# Patient Record
Sex: MALE | Race: BLACK OR AFRICAN AMERICAN | NOT HISPANIC OR LATINO | Employment: OTHER | URBAN - METROPOLITAN AREA
[De-identification: names, ages, dates, MRNs, and addresses within clinical notes are randomized per-mention and may not be internally consistent; named-entity substitution may affect disease eponyms.]

---

## 2017-01-03 ENCOUNTER — OFFICE VISIT (OUTPATIENT)
Dept: HEMATOLOGY/ONCOLOGY | Facility: CLINIC | Age: 72
End: 2017-01-03
Payer: MEDICARE

## 2017-01-03 ENCOUNTER — LAB VISIT (OUTPATIENT)
Dept: LAB | Facility: HOSPITAL | Age: 72
End: 2017-01-03
Attending: INTERNAL MEDICINE
Payer: MEDICARE

## 2017-01-03 VITALS
HEART RATE: 107 BPM | BODY MASS INDEX: 15.76 KG/M2 | SYSTOLIC BLOOD PRESSURE: 111 MMHG | WEIGHT: 122.81 LBS | RESPIRATION RATE: 17 BRPM | HEIGHT: 74 IN | DIASTOLIC BLOOD PRESSURE: 65 MMHG

## 2017-01-03 DIAGNOSIS — N18.6 ESRD (END STAGE RENAL DISEASE): ICD-10-CM

## 2017-01-03 DIAGNOSIS — C18.9 COLON CANCER METASTASIZED TO LIVER: ICD-10-CM

## 2017-01-03 DIAGNOSIS — C18.2 MALIGNANT NEOPLASM OF ASCENDING COLON: ICD-10-CM

## 2017-01-03 DIAGNOSIS — C18.7 MALIGNANT NEOPLASM OF SIGMOID COLON: Primary | ICD-10-CM

## 2017-01-03 DIAGNOSIS — C78.7 COLON CANCER METASTASIZED TO LIVER: ICD-10-CM

## 2017-01-03 DIAGNOSIS — C18.7 MALIGNANT NEOPLASM OF SIGMOID COLON: ICD-10-CM

## 2017-01-03 DIAGNOSIS — C78.00 MALIGNANT NEOPLASM METASTATIC TO LUNG, UNSPECIFIED LATERALITY: ICD-10-CM

## 2017-01-03 LAB
ALBUMIN SERPL BCP-MCNC: 2.6 G/DL
ALP SERPL-CCNC: 186 U/L
ALT SERPL W/O P-5'-P-CCNC: 26 U/L
ANION GAP SERPL CALC-SCNC: 8 MMOL/L
ANISOCYTOSIS BLD QL SMEAR: ABNORMAL
AST SERPL-CCNC: 26 U/L
BASOPHILS # BLD AUTO: 0.01 K/UL
BASOPHILS NFR BLD: 0.1 %
BILIRUB SERPL-MCNC: 0.7 MG/DL
BUN SERPL-MCNC: 9 MG/DL
CALCIUM SERPL-MCNC: 7.3 MG/DL
CHLORIDE SERPL-SCNC: 96 MMOL/L
CO2 SERPL-SCNC: 37 MMOL/L
CREAT SERPL-MCNC: 2 MG/DL
DIFFERENTIAL METHOD: ABNORMAL
EOSINOPHIL # BLD AUTO: 0 K/UL
EOSINOPHIL NFR BLD: 0.1 %
ERYTHROCYTE [DISTWIDTH] IN BLOOD BY AUTOMATED COUNT: 19.4 %
EST. GFR  (AFRICAN AMERICAN): 38 ML/MIN/1.73 M^2
EST. GFR  (NON AFRICAN AMERICAN): 33 ML/MIN/1.73 M^2
GLUCOSE SERPL-MCNC: 106 MG/DL
HCT VFR BLD AUTO: 24.7 %
HGB BLD-MCNC: 7.5 G/DL
HYPOCHROMIA BLD QL SMEAR: ABNORMAL
INR PPP: 1.6
LYMPHOCYTES # BLD AUTO: 1.8 K/UL
LYMPHOCYTES NFR BLD: 16.5 %
MCH RBC QN AUTO: 31.3 PG
MCHC RBC AUTO-ENTMCNC: 30.4 %
MCV RBC AUTO: 103 FL
MONOCYTES # BLD AUTO: 1.7 K/UL
MONOCYTES NFR BLD: 15.9 %
NEUTROPHILS # BLD AUTO: 7.2 K/UL
NEUTROPHILS NFR BLD: 67.4 %
NRBC BLD-RTO: 0 /100 WBC
PLATELET # BLD AUTO: 136 K/UL
PLATELET BLD QL SMEAR: ABNORMAL
PMV BLD AUTO: 10.6 FL
POLYCHROMASIA BLD QL SMEAR: ABNORMAL
POTASSIUM SERPL-SCNC: 3.5 MMOL/L
PROT SERPL-MCNC: 6.3 G/DL
RBC # BLD AUTO: 2.4 M/UL
SODIUM SERPL-SCNC: 141 MMOL/L
WBC # BLD AUTO: 10.61 K/UL

## 2017-01-03 PROCEDURE — 85025 COMPLETE CBC W/AUTO DIFF WBC: CPT

## 2017-01-03 PROCEDURE — 99214 OFFICE O/P EST MOD 30 MIN: CPT | Mod: PBBFAC,PN | Performed by: INTERNAL MEDICINE

## 2017-01-03 PROCEDURE — 80053 COMPREHEN METABOLIC PANEL: CPT

## 2017-01-03 PROCEDURE — 85025 COMPLETE CBC W/AUTO DIFF WBC: CPT | Mod: PN

## 2017-01-03 PROCEDURE — 80053 COMPREHEN METABOLIC PANEL: CPT | Mod: PN

## 2017-01-03 PROCEDURE — 99999 PR PBB SHADOW E&M-EST. PATIENT-LVL IV: CPT | Mod: PBBFAC,,, | Performed by: INTERNAL MEDICINE

## 2017-01-03 PROCEDURE — 99215 OFFICE O/P EST HI 40 MIN: CPT | Mod: S$PBB,,, | Performed by: INTERNAL MEDICINE

## 2017-01-03 RX ORDER — HEPARIN 100 UNIT/ML
500 SYRINGE INTRAVENOUS
Status: CANCELLED | OUTPATIENT
Start: 2017-01-06

## 2017-01-03 RX ORDER — FLUOROURACIL 50 MG/ML
400 INJECTION, SOLUTION INTRAVENOUS
Status: CANCELLED | OUTPATIENT
Start: 2017-01-04

## 2017-01-03 RX ORDER — SODIUM CHLORIDE 0.9 % (FLUSH) 0.9 %
10 SYRINGE (ML) INJECTION
Status: CANCELLED | OUTPATIENT
Start: 2017-01-06

## 2017-01-03 NOTE — PROGRESS NOTES
"Subjective:        Subjective:       Patient ID: Robert Gonsales is a 71 y.o. male.    Chief Complaint:wenrt for dialysis with DR. Owusu and here for chemo clearance  HPI:   A 71-year-old  male coming in consultation. [facility]   [facility] Wife states that he has been trying to get   an appointment for the past several months. He was diagnosed with colon cancer,  high-grade 5 cm, nodes positive 5 out of 38 were removed. In April 2016, he   had extensive lysis of adhesions, right hepatic resection for metastatic   lesions. In October 2015, the patient had FOLFIRI planned six cycles completed   only one out of six. In September 2015, the patient had left hepatic resection.  The patient started diagnosis in June 2014 with right partial colectomy and   liver biopsy. In August 2014, the patient had FOLFOX with Avastin 12 cycles   were planned. He completed two out of 15. The patient wants to continue with   treatment here. He is on Coumadin because of atrial fibrillation. He also has   gout. The patient has a history of congestive heart failure, coronary artery   disease, diabetes and dyslipidemia. No other significant past surgical history   except as above. The patient is here with his wife. [facility] most recent [facility], which showed multiple lung nodules consistent with   metastatic disease. Since then, the patient has not been given any treatment of  seeing an oncologist. The patient he is on dialysis, Tuesday/thursday/ ansd Saturday.  Past 2 years.  his appetitie is poor, he ambulates with a walker, patient wants to try chemotherapy. " wants to live"  Pt had seen Dr. Vegas and received chemotherapy [facility] then [facility] not [facility]  Started FOLFOX + Avastin, here for cycle #3  Seems to have tolerated rx wel. Drinking nepro's not eating much, weak, tired. Needs refill on pain meds    PHYSICAL EXAM:     Vitals:    01/03/17 1347 "   BP: 111/65   Pulse: 107   Resp: 17     Debilitated, black male, disheveled, seems out of sortds, in wheelchair,awake and alert, asking for copies of our lab order sheet.   rs bilat CTA, CVS: normal exam no wheezes, no ronchi  Abd: soft, nontender, no nmasses    Laboratory:     CBC:  Lab Results   Component Value Date    WBC 10.61 01/03/2017    RBC 2.40 (L) 01/03/2017    HGB 7.5 (L) 01/03/2017    HCT 24.7 (L) 01/03/2017     (H) 01/03/2017    MCH 31.3 (H) 01/03/2017    MCHC 30.4 (L) 01/03/2017    RDW 19.4 (H) 01/03/2017     (L) 01/03/2017    MPV 10.6 01/03/2017    GRAN 7.2 01/03/2017    GRAN 67.4 01/03/2017    LYMPH 1.8 01/03/2017    LYMPH 16.5 (L) 01/03/2017    MONO 1.7 (H) 01/03/2017    MONO 15.9 (H) 01/03/2017    EOS 0.0 01/03/2017    BASO 0.01 01/03/2017    EOSINOPHIL 0.1 01/03/2017    BASOPHIL 0.1 01/03/2017       BMP: BMP  Lab Results   Component Value Date     01/03/2017    K 3.5 01/03/2017    CL 96 01/03/2017    CO2 37 (H) 01/03/2017    BUN 9 01/03/2017    CREATININE 2.00 (H) 01/03/2017    CALCIUM 7.3 (L) 01/03/2017    ANIONGAP 8 01/03/2017    ESTGFRAFRICA 38 (A) 01/03/2017    EGFRNONAA 33 (A) 01/03/2017       LFT:   Lab Results   Component Value Date    ALT 26 01/03/2017    AST 26 01/03/2017    ALKPHOS 186 (H) 01/03/2017    BILITOT 0.7 01/03/2017     BNP will remain high per DR. pino due to renal failure so cardiolgy f/u canceled spoke to DR. Pino    Assessment/Plan:       1. Malignant neoplasm of sigmoid colon    2. Malignant neoplasm metastatic to lung, unspecified laterality    3. Colon cancer metastasized to liver    4. ESRD (end stage renal disease)        Severe anemia: 2 units prbc asap:talked to DR. Pino, he will transfuse during dialysis on thursday  Colon ca metastatic to lung and liver. Proceed with cycle #3 of FOLFOX with avastin  ESRD: pt continues with dialysis    rtc 2 weeks for ongoing rx with FOLFOX + Avastin with cbc, cmp   norco increased to 10 mg q 4hrs -q6  hiurs

## 2017-01-04 ENCOUNTER — INFUSION (OUTPATIENT)
Dept: INFUSION THERAPY | Facility: HOSPITAL | Age: 72
End: 2017-01-04
Attending: INTERNAL MEDICINE
Payer: MEDICARE

## 2017-01-04 ENCOUNTER — ANTI-COAG VISIT (OUTPATIENT)
Dept: CARDIOLOGY | Facility: CLINIC | Age: 72
End: 2017-01-04

## 2017-01-04 ENCOUNTER — TELEPHONE (OUTPATIENT)
Dept: INFUSION THERAPY | Facility: HOSPITAL | Age: 72
End: 2017-01-04

## 2017-01-04 VITALS
BODY MASS INDEX: 16.22 KG/M2 | WEIGHT: 126.38 LBS | HEART RATE: 84 BPM | RESPIRATION RATE: 18 BRPM | TEMPERATURE: 98 F | DIASTOLIC BLOOD PRESSURE: 82 MMHG | SYSTOLIC BLOOD PRESSURE: 157 MMHG

## 2017-01-04 DIAGNOSIS — C18.9 METASTATIC COLON CANCER TO LIVER: ICD-10-CM

## 2017-01-04 DIAGNOSIS — Z79.01 LONG-TERM (CURRENT) USE OF ANTICOAGULANTS: ICD-10-CM

## 2017-01-04 DIAGNOSIS — C78.7 METASTATIC COLON CANCER TO LIVER: ICD-10-CM

## 2017-01-04 DIAGNOSIS — C78.00 MALIGNANT NEOPLASM METASTATIC TO LUNG, UNSPECIFIED LATERALITY: Primary | ICD-10-CM

## 2017-01-04 PROCEDURE — 96417 CHEMO IV INFUS EACH ADDL SEQ: CPT | Mod: PN

## 2017-01-04 PROCEDURE — 96368 THER/DIAG CONCURRENT INF: CPT | Mod: PN

## 2017-01-04 PROCEDURE — 96367 TX/PROPH/DG ADDL SEQ IV INF: CPT | Mod: PN

## 2017-01-04 PROCEDURE — 96416 CHEMO PROLONG INFUSE W/PUMP: CPT | Mod: PN

## 2017-01-04 PROCEDURE — 96415 CHEMO IV INFUSION ADDL HR: CPT | Mod: PN

## 2017-01-04 PROCEDURE — 25000003 PHARM REV CODE 250: Mod: PN | Performed by: INTERNAL MEDICINE

## 2017-01-04 PROCEDURE — 96411 CHEMO IV PUSH ADDL DRUG: CPT | Mod: PN

## 2017-01-04 PROCEDURE — 63600175 PHARM REV CODE 636 W HCPCS: Mod: PN | Performed by: INTERNAL MEDICINE

## 2017-01-04 PROCEDURE — 96413 CHEMO IV INFUSION 1 HR: CPT | Mod: PN

## 2017-01-04 RX ORDER — FLUOROURACIL 50 MG/ML
400 INJECTION, SOLUTION INTRAVENOUS
Status: COMPLETED | OUTPATIENT
Start: 2017-01-04 | End: 2017-01-04

## 2017-01-04 RX ADMIN — PALONOSETRON HYDROCHLORIDE 0.25 MG: 0.25 INJECTION INTRAVENOUS at 09:01

## 2017-01-04 RX ADMIN — FLUOROURACIL 755 MG: 5 INJECTION, SOLUTION INTRAVENOUS at 12:01

## 2017-01-04 RX ADMIN — LEUCOVORIN CALCIUM 756 MG: 350 INJECTION, POWDER, LYOPHILIZED, FOR SOLUTION INTRAMUSCULAR; INTRAVENOUS at 10:01

## 2017-01-04 RX ADMIN — BEVACIZUMAB 340 MG: 400 INJECTION, SOLUTION INTRAVENOUS at 12:01

## 2017-01-04 RX ADMIN — OXALIPLATIN 161 MG: 100 INJECTION, SOLUTION, CONCENTRATE INTRAVENOUS at 10:01

## 2017-01-04 RX ADMIN — SODIUM CHLORIDE: 0.9 INJECTION, SOLUTION INTRAVENOUS at 09:01

## 2017-01-04 RX ADMIN — FLUOROURACIL 4535 MG: 5 INJECTION, SOLUTION INTRAVENOUS at 12:01

## 2017-01-04 RX ADMIN — DEXTROSE: 5 SOLUTION INTRAVENOUS at 10:01

## 2017-01-04 NOTE — MR AVS SNAPSHOT
Patient Information     Patient Name Sex Robert García Male 1945      Visit Information        Provider Department Dept Phone Center    2017 10:30 AM CHAIR [facility] CHEMO [facility] [phone] [facility]      Patient Instructions     None      Your Current Medications Are     allopurinol (ZYLOPRIM) 100 MG tablet    diphenhydrAMINE-aluminum-magnesium hydroxide-simethicone-lidocaine HCl 2%    docusate sodium (COLACE) 100 MG capsule    ferrous sulfate 325 mg (65 mg iron) Tab tablet    hydrocodone-acetaminophen 5-325mg (NORCO) 5-325 mg per tablet    lidocaine-prilocaine (EMLA) cream    megestrol (MEGACE) 400 mg/10 mL (40 mg/mL) Susp    minoxidil (LONITEN) 10 MG Tab    mirtazapine (REMERON) 30 MG tablet    nitroGLYCERIN (NITROSTAT) 0.4 MG SL tablet    ondansetron (ZOFRAN-ODT) 8 MG TbDL    potassium chloride SA (K-DUR,KLOR-CON) 20 MEQ tablet    prochlorperazine (COMPAZINE) 10 MG tablet    psyllium (METAMUCIL) packet    ranitidine (ZANTAC) 75 MG tablet    MAGALIE-ALF 0.8 mg Tab    sevelamer carbonate (RENVELA) 800 mg Tab    terazosin (HYTRIN) 5 MG capsule    warfarin (COUMADIN) 2 MG tablet    water Liqd 150 mL with MILK OF MAGNESIA 400 mg/5 mL Susp 400 mg, diphenhydrAMINE 12.5 mg/5 mL Elix 60 mg, nystatin 100,000 unit/mL Susp 500,000 Units      Facility-Administered Medications     bevacizumab (AVASTIN) 5 mg/kg = 340 mg in sodium chloride 0.9% 100 mL chemo infusion    dextrose 5 % 100 mL flush bag    fluorouracil 2,400 mg/m2 = 4,535 mg in sodium chloride 0.9% 100 mL chemo infusion    fluorouracil injection 755 mg    leucovorin calcium 400 mg/m2 = 756 mg in dextrose 5 % 250 mL infusion    oxaliplatin (ELOXATIN) 85 mg/m2 = 161 mg in dextrose 5 % 500 mL chemo infusion    palonosetron 0.25mg/dexamethasone 10mg IVPB    sodium chloride 0.9% 100 mL flush bag      Appointments for Next Year     2017 12:00 PM INFUSION 030 MIN (30 min.) [facility] CHAIR 27,  "STPH [facility] CHEMO    Arrive at check-in approximately 15 minutes before your scheduled appointment time. Bring all outside medical records and imaging, along with a list of your current medications and insurance card.    1st Floor    1/17/2017  1:30 PM NON FASTING LAB (15 min.) [facility] Laboratory LAB, [facility] DRAW STATION    Arrive at check-in approximately 15 minutes before your scheduled appointment time. Bring all outside medical records and imaging, along with a list of your current medications and insurance card.    1/17/2017  2:40 PM ESTABLISHED PATIENT (20 min.) [facility] Hematology Patti Donahue MD    Arrive at check-in approximately 15 minutes before your scheduled appointment time. Bring all outside medical records and imaging, along with a list of your current medications and insurance card.    1/18/2017 10:30 AM INFUSION 360 MIN (360 min.) [facility] CHAIR 03, STPH [facility] CHEMO    Arrive at check-in approximately 15 minutes before your scheduled appointment time. Bring all outside medical records and imaging, along with a list of your current medications and insurance card.    1st Floor    1/20/2017  1:00 PM INFUSION 030 MIN (30 min.) [facility] CHAIR 15, STPH [facility] CHEMO    Arrive at check-in approximately 15 minutes before your scheduled appointment time. Bring all outside medical records and imaging, along with a list of your current medications and insurance card.    1st Floor         Default Flowsheet Data (last 24 hours)      Amb Complex Vitals Kory        01/04/17 1300 01/04/17 0915 01/04/17 0850 01/03/17 1347       Measurements    Weight  57.3 kg (126 lb 5.5 oz)  55.7 kg (122 lb 12.7 oz)     Height    6' 2" (1.88 m)     BSA (Calculated - sq m)    1.71 sq meters     BMI (Calculated)    15.8     BP (!)  157/82  (!)  141/74 111/65     Temp   97.9 °F (36.6 °C)      Pulse 84  103 107     Resp 18  16 17     Pain Assessment    Pain Score    Zero   weakness; " dry throat             Allergies     No Known Allergies      Medications You Received from 01/03/2017 1329 to 01/04/2017 1329        Date/Time Order Dose Route Action     01/04/2017 1216 bevacizumab (AVASTIN) 5 mg/kg = 340 mg in sodium chloride 0.9% 100 mL chemo infusion 340 mg Intravenous New Bag     01/04/2017 1004 dextrose 5 % 100 mL flush bag   Intravenous New Bag     01/04/2017 1252 fluorouracil 2,400 mg/m2 = 4,535 mg in sodium chloride 0.9% 100 mL chemo infusion 4,535 mg Intravenous Given     01/04/2017 1251 fluorouracil injection 755 mg 755 mg Intravenous New Bag     01/04/2017 1003 leucovorin calcium 400 mg/m2 = 756 mg in dextrose 5 % 250 mL infusion 756 mg Intravenous New Bag     01/04/2017 1003 oxaliplatin (ELOXATIN) 85 mg/m2 = 161 mg in dextrose 5 % 500 mL chemo infusion 161 mg Intravenous New Bag     01/04/2017 0928 palonosetron 0.25mg/dexamethasone 10mg IVPB 0.25 mg Intravenous New Bag     01/04/2017 0928 sodium chloride 0.9% 100 mL flush bag   Intravenous New Bag      Current Discharge Medication List     Cannot display discharge medications since this is not an admission.

## 2017-01-04 NOTE — PLAN OF CARE
Problem: Patient Care Overview  Goal: Plan of Care Review  Outcome: Ongoing (interventions implemented as appropriate)  TOLERATED TREATMENT WITHOUT DIFFICULTY.

## 2017-01-04 NOTE — TELEPHONE ENCOUNTER
Spoke with Dr. Donahue.  Do not adjust dosing of meds based on patient's weight as he is on dialysis.  Continue to dose on original 68.4 kg weight.

## 2017-01-04 NOTE — PLAN OF CARE
Problem: Chemotherapy Effects (Adult)  Goal: Signs and Symptoms of Listed Potential Problems Will be Absent, Minimized or Managed (Chemotherapy Effects)  Signs and symptoms of listed potential problems will be absent, minimized or managed by discharge/transition of care (reference Chemotherapy Effects (Adult) CPG).   Outcome: Ongoing (interventions implemented as appropriate)  TOLERATED TREATMENT WITHOUT DIFFICULTY.explained to pt wife that he stated he usually gets 2 pain pills but he complained he only had one this am it was all his wife gave him then she left.

## 2017-01-06 ENCOUNTER — INFUSION (OUTPATIENT)
Dept: INFUSION THERAPY | Facility: HOSPITAL | Age: 72
End: 2017-01-06
Attending: INTERNAL MEDICINE
Payer: MEDICARE

## 2017-01-06 VITALS
RESPIRATION RATE: 18 BRPM | SYSTOLIC BLOOD PRESSURE: 146 MMHG | TEMPERATURE: 98 F | DIASTOLIC BLOOD PRESSURE: 82 MMHG | HEART RATE: 101 BPM

## 2017-01-06 DIAGNOSIS — C78.00 MALIGNANT NEOPLASM METASTATIC TO LUNG, UNSPECIFIED LATERALITY: Primary | ICD-10-CM

## 2017-01-06 DIAGNOSIS — C78.7 METASTATIC COLON CANCER TO LIVER: ICD-10-CM

## 2017-01-06 DIAGNOSIS — C18.9 METASTATIC COLON CANCER TO LIVER: ICD-10-CM

## 2017-01-06 PROCEDURE — 25000003 PHARM REV CODE 250: Mod: PN | Performed by: INTERNAL MEDICINE

## 2017-01-06 PROCEDURE — 96372 THER/PROPH/DIAG INJ SC/IM: CPT | Mod: PN

## 2017-01-06 PROCEDURE — 96377 APPLICATON ON-BODY INJECTOR: CPT | Mod: PN

## 2017-01-06 PROCEDURE — 63600175 PHARM REV CODE 636 W HCPCS: Mod: PN | Performed by: INTERNAL MEDICINE

## 2017-01-06 PROCEDURE — 96523 IRRIG DRUG DELIVERY DEVICE: CPT | Mod: PN

## 2017-01-06 RX ORDER — HEPARIN 100 UNIT/ML
500 SYRINGE INTRAVENOUS
Status: DISCONTINUED | OUTPATIENT
Start: 2017-01-06 | End: 2017-01-06 | Stop reason: HOSPADM

## 2017-01-06 RX ORDER — SODIUM CHLORIDE 0.9 % (FLUSH) 0.9 %
10 SYRINGE (ML) INJECTION
Status: DISCONTINUED | OUTPATIENT
Start: 2017-01-06 | End: 2017-01-06 | Stop reason: HOSPADM

## 2017-01-06 RX ADMIN — HEPARIN SODIUM (PORCINE) LOCK FLUSH IV SOLN 100 UNIT/ML 500 UNITS: 100 SOLUTION at 01:01

## 2017-01-06 RX ADMIN — PEGFILGRASTIM 6 MG: KIT SUBCUTANEOUS at 02:01

## 2017-01-06 RX ADMIN — Medication 10 ML: at 01:01

## 2017-01-06 NOTE — PLAN OF CARE
Problem: Chemotherapy Effects (Adult)  Goal: Signs and Symptoms of Listed Potential Problems Will be Absent, Minimized or Managed (Chemotherapy Effects)  Signs and symptoms of listed potential problems will be absent, minimized or managed by discharge/transition of care (reference Chemotherapy Effects (Adult) CPG).   Outcome: Ongoing (interventions implemented as appropriate)  TOLERATED TREATMENT WITHOUT DIFFICULTY.

## 2017-01-09 ENCOUNTER — ANTI-COAG VISIT (OUTPATIENT)
Dept: CARDIOLOGY | Facility: CLINIC | Age: 72
End: 2017-01-09

## 2017-01-09 DIAGNOSIS — Z79.01 LONG-TERM (CURRENT) USE OF ANTICOAGULANTS: ICD-10-CM

## 2017-01-09 LAB — INR PPP: 1.2

## 2017-01-09 NOTE — PROGRESS NOTES
0 mg on Wed; 1 mg all other days, pt did not make dose adjustment 1/6. Pt denies all other changes

## 2017-01-11 ENCOUNTER — TELEPHONE (OUTPATIENT)
Dept: HEMATOLOGY/ONCOLOGY | Facility: CLINIC | Age: 72
End: 2017-01-11

## 2017-01-11 LAB — INR PPP: 1.3

## 2017-01-11 NOTE — TELEPHONE ENCOUNTER
Received call from female who stated she was patient's wife and that she was trying to get an prescription for pain medication written by Dr. [facility]ue [facility] [place]. She she was asking for the referral number and the MD that referred patient to Dr. Donahue as that is what the [facility] pharmacy needs in order to attempt to fill the prescription. Reached out to auth department to assist in locating the referral authorization number. Wife gave phone number to [facility] of [phone]. Called to speak with pharmacy, but pharmacy had already closed at 4 pm. Will call back tomorrow and talk with [facility] pharmacy. Called wife back at [phone] and informed that I will call [facility] pharmacy tomorrow after 8:30 am when they open and let her know the outcome. Wife voiced understanding and appreciation

## 2017-01-12 ENCOUNTER — ANTI-COAG VISIT (OUTPATIENT)
Dept: CARDIOLOGY | Facility: CLINIC | Age: 72
End: 2017-01-12

## 2017-01-12 DIAGNOSIS — Z79.01 LONG-TERM (CURRENT) USE OF ANTICOAGULANTS: ICD-10-CM

## 2017-01-16 ENCOUNTER — ANTI-COAG VISIT (OUTPATIENT)
Dept: CARDIOLOGY | Facility: CLINIC | Age: 72
End: 2017-01-16

## 2017-01-16 DIAGNOSIS — Z79.01 LONG-TERM (CURRENT) USE OF ANTICOAGULANTS: ICD-10-CM

## 2017-01-16 LAB — INR PPP: 1.9

## 2017-01-17 ENCOUNTER — LAB VISIT (OUTPATIENT)
Dept: LAB | Facility: HOSPITAL | Age: 72
End: 2017-01-17
Attending: INTERNAL MEDICINE
Payer: MEDICARE

## 2017-01-17 ENCOUNTER — TELEPHONE (OUTPATIENT)
Dept: HEMATOLOGY/ONCOLOGY | Facility: CLINIC | Age: 72
End: 2017-01-17

## 2017-01-17 ENCOUNTER — OFFICE VISIT (OUTPATIENT)
Dept: HEMATOLOGY/ONCOLOGY | Facility: CLINIC | Age: 72
End: 2017-01-17
Payer: MEDICARE

## 2017-01-17 VITALS
HEART RATE: 112 BPM | TEMPERATURE: 98 F | HEIGHT: 74 IN | SYSTOLIC BLOOD PRESSURE: 104 MMHG | RESPIRATION RATE: 16 BRPM | DIASTOLIC BLOOD PRESSURE: 70 MMHG

## 2017-01-17 DIAGNOSIS — C18.2 MALIGNANT NEOPLASM OF ASCENDING COLON: Primary | ICD-10-CM

## 2017-01-17 DIAGNOSIS — C18.9 METASTATIC COLON CANCER TO LIVER: ICD-10-CM

## 2017-01-17 DIAGNOSIS — C78.7 METASTATIC COLON CANCER TO LIVER: ICD-10-CM

## 2017-01-17 DIAGNOSIS — C78.00 MALIGNANT NEOPLASM METASTATIC TO LUNG, UNSPECIFIED LATERALITY: ICD-10-CM

## 2017-01-17 DIAGNOSIS — C18.7 MALIGNANT NEOPLASM OF SIGMOID COLON: ICD-10-CM

## 2017-01-17 LAB
ALBUMIN SERPL BCP-MCNC: 2.9 G/DL
ALP SERPL-CCNC: 181 U/L
ALT SERPL W/O P-5'-P-CCNC: 25 U/L
ANION GAP SERPL CALC-SCNC: 10 MMOL/L
ANISOCYTOSIS BLD QL SMEAR: SLIGHT
AST SERPL-CCNC: 32 U/L
BASOPHILS NFR BLD: 0 %
BILIRUB SERPL-MCNC: 1.2 MG/DL
BUN SERPL-MCNC: 11 MG/DL
BURR CELLS BLD QL SMEAR: ABNORMAL
CALCIUM SERPL-MCNC: 7.4 MG/DL
CEA SERPL-MCNC: 658 NG/ML
CHLORIDE SERPL-SCNC: 91 MMOL/L
CO2 SERPL-SCNC: 33 MMOL/L
CREAT SERPL-MCNC: 2.02 MG/DL
DIFFERENTIAL METHOD: ABNORMAL
EOSINOPHIL NFR BLD: 0 %
ERYTHROCYTE [DISTWIDTH] IN BLOOD BY AUTOMATED COUNT: 19.3 %
EST. GFR  (AFRICAN AMERICAN): 38 ML/MIN/1.73 M^2
EST. GFR  (NON AFRICAN AMERICAN): 33 ML/MIN/1.73 M^2
GLUCOSE SERPL-MCNC: 89 MG/DL
HCT VFR BLD AUTO: 30.3 %
HGB BLD-MCNC: 9.7 G/DL
HYPOCHROMIA BLD QL SMEAR: ABNORMAL
LYMPHOCYTES NFR BLD: 25 %
MCH RBC QN AUTO: 30.9 PG
MCHC RBC AUTO-ENTMCNC: 32 %
MCV RBC AUTO: 97 FL
MONOCYTES NFR BLD: 12 %
NEUTROPHILS # BLD AUTO: 6.6 K/UL
NEUTROPHILS NFR BLD: 63 %
NRBC BLD-RTO: 0 /100 WBC
OVALOCYTES BLD QL SMEAR: ABNORMAL
PLATELET # BLD AUTO: 101 K/UL
PMV BLD AUTO: 11.3 FL
POIKILOCYTOSIS BLD QL SMEAR: ABNORMAL
POLYCHROMASIA BLD QL SMEAR: ABNORMAL
POTASSIUM SERPL-SCNC: 3.4 MMOL/L
PROT SERPL-MCNC: 6.7 G/DL
RBC # BLD AUTO: 3.14 M/UL
SODIUM SERPL-SCNC: 134 MMOL/L
WBC # BLD AUTO: 10.51 K/UL

## 2017-01-17 PROCEDURE — 99999 PR PBB SHADOW E&M-EST. PATIENT-LVL III: CPT | Mod: PBBFAC,,, | Performed by: INTERNAL MEDICINE

## 2017-01-17 PROCEDURE — 85007 BL SMEAR W/DIFF WBC COUNT: CPT

## 2017-01-17 PROCEDURE — 82378 CARCINOEMBRYONIC ANTIGEN: CPT

## 2017-01-17 PROCEDURE — 85027 COMPLETE CBC AUTOMATED: CPT

## 2017-01-17 PROCEDURE — 99213 OFFICE O/P EST LOW 20 MIN: CPT | Mod: PBBFAC,PN | Performed by: INTERNAL MEDICINE

## 2017-01-17 PROCEDURE — 99215 OFFICE O/P EST HI 40 MIN: CPT | Mod: S$PBB,,, | Performed by: INTERNAL MEDICINE

## 2017-01-17 PROCEDURE — 80053 COMPREHEN METABOLIC PANEL: CPT

## 2017-01-17 RX ORDER — HEPARIN 100 UNIT/ML
500 SYRINGE INTRAVENOUS
Status: CANCELLED | OUTPATIENT
Start: 2017-01-20

## 2017-01-17 RX ORDER — SODIUM CHLORIDE 0.9 % (FLUSH) 0.9 %
10 SYRINGE (ML) INJECTION
Status: CANCELLED | OUTPATIENT
Start: 2017-01-20

## 2017-01-17 RX ORDER — FLUOROURACIL 50 MG/ML
400 INJECTION, SOLUTION INTRAVENOUS
Status: CANCELLED | OUTPATIENT
Start: 2017-01-18

## 2017-01-17 NOTE — PROGRESS NOTES
"Subjective:        Subjective:       Patient ID: Robert Gonsales is a 71 y.o. male.    Chief Complaint:wenrt for dialysis with DR. Owusu and here for chemo clearance  HPI:   A 71-year-old  male. The [facility]. Pt had a lapse between [facility] and [facility] unsure of why,  He was diagnosed with colon cancer,high-grade 5 cm, nodes positive 5 out of 38 were removed. In April 2016, he   had extensive lysis of adhesions, right hepatic resection for metastatic   lesions. In October 2015, the patient had FOLFIRI planned six cycles completed   only one out of six. In September 2015, the patient had left hepatic resection.  The patient started diagnosis in June 2014 with right partial colectomy and   liver biopsy. In August 2014, the patient had FOLFOX with Avastin 12 cycles   were planned. He completed two out of 15. The patient came to [facility] to cont rx treatment here. He is on Coumadin because of atrial fibrillation. He also has   gout. The patient has a history of congestive heart failure, coronary artery   disease, diabetes and dyslipidemia. No other significant past surgical history   except as above. The patient is here with his wife. [facility], which showed multiple lung nodules consistent with   metastatic disease. Since then, the patient has not been given any treatment of  seeing an oncologist. The patient he is on dialysis, Tuesday/thursday/ ansd Saturday.  Past 2 years.  his appetitie is poor, he ambulates with a walker, patient wants to try chemotherapy. " wants to live"  Pt had seen Dr. Vegas and received chemotherapy initially then [facility]  Started FOLFOX + Avastin, here for cycle #3  Seems to have tolerated rx wel. Drinking nepro's not eating much, weak, tired. Needs refill on pain meds    PHYSICAL EXAM:     Vitals:    01/17/17 1416   BP: 104/70   Pulse: (!) 112   Resp: 16   Temp: 97.7 °F (36.5 °C)     Debilitated, black " male, disheveled, seems out of sortds, in wheelchair,awake and alert, asking for copies of our lab order sheet.   rs bilat CTA, CVS: normal exam no wheezes, no ronchi  Abd: soft, nontender, no nmasses    Laboratory:     CBC:  Lab Results   Component Value Date    WBC 10.51 01/17/2017    RBC 3.14 (L) 01/17/2017    HGB 9.7 (L) 01/17/2017    HCT 30.3 (L) 01/17/2017    MCV 97 01/17/2017    MCH 30.9 01/17/2017    MCHC 32.0 01/17/2017    RDW 19.3 (H) 01/17/2017     (L) 01/17/2017    MPV 11.3 01/17/2017    GRAN 6.6 01/17/2017    GRAN 63.0 01/17/2017    LYMPH 25.0 01/17/2017    MONO 12.0 01/17/2017    EOS 0.0 01/03/2017    BASO 0.01 01/03/2017    EOSINOPHIL 0.0 01/17/2017    BASOPHIL 0.0 01/17/2017       BMP: BMP  Lab Results   Component Value Date     (L) 01/17/2017    K 3.4 (L) 01/17/2017    CL 91 (L) 01/17/2017    CO2 33 (H) 01/17/2017    BUN 11 01/17/2017    CREATININE 2.02 (H) 01/17/2017    CALCIUM 7.4 (L) 01/17/2017    ANIONGAP 10 01/17/2017    ESTGFRAFRICA 38 (A) 01/17/2017    EGFRNONAA 33 (A) 01/17/2017       LFT:   Lab Results   Component Value Date    ALT 25 01/17/2017    AST 32 01/17/2017    ALKPHOS 181 (H) 01/17/2017    BILITOT 1.2 01/17/2017     BNP will remain high per DR. pino due to renal failure so cardiolgy f/u canceled spoke to DR. Pino    Assessment/Plan:       1. Malignant neoplasm of ascending colon        Pt recd transfusion last visit during dialysis with DR. Pino   wrote note to PALLAVI Pino to add procrit during dialysis.   Pt is emaciated , not eating much , will cont rx but I am not sure of his tolerance.   all his pain meds [facility] come [facility]   rtc 2 weeks with cbc, cmp

## 2017-01-17 NOTE — TELEPHONE ENCOUNTER
Spoke with Mrs Gonsales regarding the filling of the prescription of the pain medication for Mr Gonsales through the pharmacy [facility] the [facility].  According to the pharmacy, they were unable to fill the prescription because Dr Donahue was not a preferred provider within their system.  Nurse practicioner Vianney of Mr Jacinto PCP, Dr Hernandez, at the [facility] would also not rewrite the prescription for the patient to be filled as they did not routinely write pain medication prescriptions.  Per Vianney however, Mr Gonsales had an appt on 1/12/17 with the [facility] team at the [facility] [place] and they would be able to take over pain management for the patient at this point.  Because the pallative care team is within the [facility] system, he would have no difficulty getting this medication filled if written by this office.  JERONIMO Faith with Dr Sequeira office was going to call Mrs Gonsales and remind her not only of this appt which she stated she was aware of, but also of a dentist appt this afternoon as well.  The provider associated with the Pallative Care team is Dr Dante Loving who is the psychiatrist on the team.      JERONIMO Faith with Dr Hernandez can be reached at [phone].

## 2017-01-18 ENCOUNTER — INFUSION (OUTPATIENT)
Dept: INFUSION THERAPY | Facility: HOSPITAL | Age: 72
End: 2017-01-18
Attending: INTERNAL MEDICINE
Payer: MEDICARE

## 2017-01-18 VITALS
SYSTOLIC BLOOD PRESSURE: 126 MMHG | WEIGHT: 125.63 LBS | HEART RATE: 97 BPM | HEIGHT: 74 IN | TEMPERATURE: 98 F | DIASTOLIC BLOOD PRESSURE: 69 MMHG | RESPIRATION RATE: 16 BRPM | BODY MASS INDEX: 16.12 KG/M2

## 2017-01-18 DIAGNOSIS — C78.00 MALIGNANT NEOPLASM METASTATIC TO LUNG, UNSPECIFIED LATERALITY: Primary | ICD-10-CM

## 2017-01-18 DIAGNOSIS — C78.7 METASTATIC COLON CANCER TO LIVER: ICD-10-CM

## 2017-01-18 DIAGNOSIS — C18.9 METASTATIC COLON CANCER TO LIVER: ICD-10-CM

## 2017-01-18 PROCEDURE — 63600175 PHARM REV CODE 636 W HCPCS: Mod: PN | Performed by: INTERNAL MEDICINE

## 2017-01-18 PROCEDURE — 96368 THER/DIAG CONCURRENT INF: CPT | Mod: PN

## 2017-01-18 PROCEDURE — 96367 TX/PROPH/DG ADDL SEQ IV INF: CPT | Mod: PN

## 2017-01-18 PROCEDURE — 25000003 PHARM REV CODE 250: Mod: PN | Performed by: INTERNAL MEDICINE

## 2017-01-18 PROCEDURE — 96417 CHEMO IV INFUS EACH ADDL SEQ: CPT | Mod: PN

## 2017-01-18 PROCEDURE — 96415 CHEMO IV INFUSION ADDL HR: CPT | Mod: PN

## 2017-01-18 PROCEDURE — 96413 CHEMO IV INFUSION 1 HR: CPT | Mod: PN

## 2017-01-18 PROCEDURE — 96416 CHEMO PROLONG INFUSE W/PUMP: CPT | Mod: PN

## 2017-01-18 RX ORDER — FLUOROURACIL 50 MG/ML
400 INJECTION, SOLUTION INTRAVENOUS
Status: COMPLETED | OUTPATIENT
Start: 2017-01-18 | End: 2017-01-18

## 2017-01-18 RX ADMIN — FLUOROURACIL 4535 MG: 5 INJECTION, SOLUTION INTRAVENOUS at 02:01

## 2017-01-18 RX ADMIN — PALONOSETRON HYDROCHLORIDE 0.25 MG: 0.25 INJECTION INTRAVENOUS at 11:01

## 2017-01-18 RX ADMIN — OXALIPLATIN 161 MG: 100 INJECTION, SOLUTION, CONCENTRATE INTRAVENOUS at 12:01

## 2017-01-18 RX ADMIN — LEUCOVORIN CALCIUM 756 MG: 350 INJECTION, POWDER, LYOPHILIZED, FOR SOLUTION INTRAMUSCULAR; INTRAVENOUS at 12:01

## 2017-01-18 RX ADMIN — SODIUM CHLORIDE: 9 INJECTION, SOLUTION INTRAVENOUS at 11:01

## 2017-01-18 RX ADMIN — FLUOROURACIL 755 MG: 50 INJECTION, SOLUTION INTRAVENOUS at 02:01

## 2017-01-18 RX ADMIN — BEVACIZUMAB 340 MG: 100 INJECTION, SOLUTION INTRAVENOUS at 11:01

## 2017-01-18 RX ADMIN — DEXTROSE: 50 INJECTION, SOLUTION INTRAVENOUS at 12:01

## 2017-01-18 NOTE — PROGRESS NOTES
Wife reports Pt started Amoxicillin last Fri.-500mg -3 times a day, has dental appt.-this Fri.-for cleaning, next Wed may have root canal, some swelling in feet, verified coumadin: 2mg last Thurs., then 1mg daily, drinks Nepro 2-3 p/day since 2 months ago--not new

## 2017-01-18 NOTE — PLAN OF CARE
Problem: Chemotherapy Effects (Adult)  Goal: Signs and Symptoms of Listed Potential Problems Will be Absent, Minimized or Managed (Chemotherapy Effects)  Signs and symptoms of listed potential problems will be absent, minimized or managed by discharge/transition of care (reference Chemotherapy Effects (Adult) CPG).   Outcome: Ongoing (interventions implemented as appropriate)  Pt tolerated treatment and left via wc.     Problem: Patient Care Overview  Goal: Plan of Care Review  Outcome: Ongoing (interventions implemented as appropriate)  Pt left with 5 fu pump via wc.

## 2017-01-18 NOTE — MR AVS SNAPSHOT
Patient Information     Patient Name Sex Robert García Male 1945      Visit Information        Provider Department Dept Phone Center    2017 10:30 AM CHAIR [facility] [phone] [facility]      Patient Instructions     None      Your Current Medications Are     allopurinol (ZYLOPRIM) 100 MG tablet    diphenhydrAMINE-aluminum-magnesium hydroxide-simethicone-lidocaine HCl 2%    docusate sodium (COLACE) 100 MG capsule    ferrous sulfate 325 mg (65 mg iron) Tab tablet    hydrocodone-acetaminophen 5-325mg (NORCO) 5-325 mg per tablet    lidocaine-prilocaine (EMLA) cream    megestrol (MEGACE) 400 mg/10 mL (40 mg/mL) Susp    minoxidil (LONITEN) 10 MG Tab    mirtazapine (REMERON) 30 MG tablet    nitroGLYCERIN (NITROSTAT) 0.4 MG SL tablet    ondansetron (ZOFRAN-ODT) 8 MG TbDL    potassium chloride SA (K-DUR,KLOR-CON) 20 MEQ tablet    prochlorperazine (COMPAZINE) 10 MG tablet    psyllium (METAMUCIL) packet    ranitidine (ZANTAC) 75 MG tablet    MAGALIE-ALF 0.8 mg Tab    sevelamer carbonate (RENVELA) 800 mg Tab    terazosin (HYTRIN) 5 MG capsule    warfarin (COUMADIN) 2 MG tablet    water Liqd 150 mL with MILK OF MAGNESIA 400 mg/5 mL Susp 400 mg, diphenhydrAMINE 12.5 mg/5 mL Elix 60 mg, nystatin 100,000 unit/mL Susp 500,000 Units      Facility-Administered Medications     bevacizumab (AVASTIN) 5 mg/kg = 340 mg in sodium chloride 0.9% 100 mL chemo infusion    dextrose 5 % 100 mL flush bag    fluorouracil (ADRUCIL) 2,400 mg/m2 = 4,535 mg in sodium chloride 0.9% 100 mL chemo infusion    fluorouracil injection 755 mg    leucovorin calcium 400 mg/m2 = 756 mg in dextrose 5 % 250 mL infusion    oxaliplatin (ELOXATIN) 85 mg/m2 = 161 mg in dextrose 5 % 500 mL chemo infusion    palonosetron 0.25mg/dexamethasone 10mg IVPB    sodium chloride 0.9% 100 mL flush bag      Appointments for Next Year     2017  1:00 PM INFUSION 030 MIN (30 min.) [facility]  "CHAIR 15, [facility] CHEMO    Arrive at check-in approximately 15 minutes before your scheduled appointment time. Bring all outside medical records and imaging, along with a list of your current medications and insurance card.    [facility] Floor    1/31/2017  1:45 PM NON FASTING LAB (15 min.) [facility] - Laboratory LAB, ST [facility] DRAW STATION    Arrive at check-in approximately 15 minutes before your scheduled appointment time. Bring all outside medical records and imaging, along with a list of your current medications and insurance card.    1/31/2017  2:40 PM ESTABLISHED PATIENT (20 min.) [facility] - Hematology Patti Donahue MD    Arrive at check-in approximately 15 minutes before your scheduled appointment time. Bring all outside medical records and imaging, along with a list of your current medications and insurance card.    2/1/2017 10:30 AM INFUSION 360 MIN (360 min.) [facility] CHAIR 30, [facility] CHEMO    Arrive at check-in approximately 15 minutes before your scheduled appointment time. Bring all outside medical records and imaging, along with a list of your current medications and insurance card.    [address] [place] [address]    2/3/2017 12:30 PM INFUSION 030 MIN (30 min.) [facility] CHAIR 10, [facility] CHEMO    Arrive at check-in approximately 15 minutes before your scheduled appointment time. Bring all outside medical records and imaging, along with a list of your current medications and insurance card.    [facility]         Default Flowsheet Data (last 24 hours)      Amb Complex Vitals Kory        01/18/17 1500 01/18/17 1039             Measurements    Weight  57 kg (125 lb 9.6 oz)       Height  6' 2" (1.88 m)       BSA (Calculated - sq m)  1.72 sq meters       BMI (Calculated)  16.2       /69 (!)  147/84       Temp  97.9 °F (36.6 °C)       Pulse 97 106       Resp 16 18       Pain Assessment    Pain Score  Zero               Allergies     No Known Allergies      Medications You " Received from 01/17/2017 1546 to 01/18/2017 1546        Date/Time Order Dose Route Action     01/18/2017 1129 bevacizumab (AVASTIN) 5 mg/kg = 340 mg in sodium chloride 0.9% 100 mL chemo infusion 340 mg Intravenous New Bag     01/18/2017 1205 dextrose 5 % 100 mL flush bag   Intravenous New Bag     01/18/2017 1458 fluorouracil (ADRUCIL) 2,400 mg/m2 = 4,535 mg in sodium chloride 0.9% 100 mL chemo infusion 4,535 mg Intravenous Given     01/18/2017 1458 fluorouracil injection 755 mg 755 mg Intravenous New Bag     01/18/2017 1212 leucovorin calcium 400 mg/m2 = 756 mg in dextrose 5 % 250 mL infusion 756 mg Intravenous New Bag     01/18/2017 1212 oxaliplatin (ELOXATIN) 85 mg/m2 = 161 mg in dextrose 5 % 500 mL chemo infusion 161 mg Intravenous New Bag     01/18/2017 1100 palonosetron 0.25mg/dexamethasone 10mg IVPB 0.25 mg Intravenous New Bag     01/18/2017 1100 sodium chloride 0.9% 100 mL flush bag   Intravenous New Bag      Current Discharge Medication List     Cannot display discharge medications since this is not an admission.

## 2017-01-19 ENCOUNTER — ANTI-COAG VISIT (OUTPATIENT)
Dept: CARDIOLOGY | Facility: CLINIC | Age: 72
End: 2017-01-19

## 2017-01-19 DIAGNOSIS — Z79.01 LONG-TERM (CURRENT) USE OF ANTICOAGULANTS: ICD-10-CM

## 2017-01-19 LAB — INR PPP: 2

## 2017-01-20 ENCOUNTER — INFUSION (OUTPATIENT)
Dept: INFUSION THERAPY | Facility: HOSPITAL | Age: 72
End: 2017-01-20
Attending: INTERNAL MEDICINE
Payer: MEDICARE

## 2017-01-20 VITALS
SYSTOLIC BLOOD PRESSURE: 115 MMHG | TEMPERATURE: 98 F | RESPIRATION RATE: 16 BRPM | DIASTOLIC BLOOD PRESSURE: 70 MMHG | HEART RATE: 101 BPM

## 2017-01-20 DIAGNOSIS — C78.7 METASTATIC COLON CANCER TO LIVER: ICD-10-CM

## 2017-01-20 DIAGNOSIS — C18.9 METASTATIC COLON CANCER TO LIVER: ICD-10-CM

## 2017-01-20 DIAGNOSIS — C78.00 MALIGNANT NEOPLASM METASTATIC TO LUNG, UNSPECIFIED LATERALITY: Primary | ICD-10-CM

## 2017-01-20 PROCEDURE — 96523 IRRIG DRUG DELIVERY DEVICE: CPT | Mod: PN

## 2017-01-20 PROCEDURE — 96377 APPLICATON ON-BODY INJECTOR: CPT | Mod: PN

## 2017-01-20 PROCEDURE — 63600175 PHARM REV CODE 636 W HCPCS: Mod: PN | Performed by: INTERNAL MEDICINE

## 2017-01-20 PROCEDURE — 25000003 PHARM REV CODE 250: Mod: PN | Performed by: INTERNAL MEDICINE

## 2017-01-20 RX ORDER — HEPARIN 100 UNIT/ML
500 SYRINGE INTRAVENOUS
Status: DISCONTINUED | OUTPATIENT
Start: 2017-01-20 | End: 2017-01-20 | Stop reason: HOSPADM

## 2017-01-20 RX ORDER — SODIUM CHLORIDE 0.9 % (FLUSH) 0.9 %
10 SYRINGE (ML) INJECTION
Status: DISCONTINUED | OUTPATIENT
Start: 2017-01-20 | End: 2017-01-20 | Stop reason: HOSPADM

## 2017-01-20 RX ADMIN — SODIUM CHLORIDE, PRESERVATIVE FREE 10 ML: 5 INJECTION INTRAVENOUS at 12:01

## 2017-01-20 RX ADMIN — PEGFILGRASTIM 6 MG: KIT SUBCUTANEOUS at 12:01

## 2017-01-20 RX ADMIN — HEPARIN 500 UNITS: 100 SYRINGE at 12:01

## 2017-01-23 NOTE — PROGRESS NOTES
Wife called 1/23/17 to oneyda appt 1/25/to 24. Per Dr request pt is having a (Root Canal) on 1/25/17.

## 2017-01-24 ENCOUNTER — ANTI-COAG VISIT (OUTPATIENT)
Dept: CARDIOLOGY | Facility: CLINIC | Age: 72
End: 2017-01-24

## 2017-01-24 DIAGNOSIS — Z79.01 LONG-TERM (CURRENT) USE OF ANTICOAGULANTS: ICD-10-CM

## 2017-01-24 LAB — INR PPP: 2.1

## 2017-01-30 ENCOUNTER — TELEPHONE (OUTPATIENT)
Dept: HEMATOLOGY/ONCOLOGY | Facility: CLINIC | Age: 72
End: 2017-01-30

## 2017-01-30 NOTE — TELEPHONE ENCOUNTER
Attempted to call Mr Gonsales to see if he could come in at 8 for his labs and then see dr Donahue at 9am on 1/31/17.  Did not receive an answer on the phone and there is no voicemailbox that is set up.  Will attempt to call again.

## 2017-01-31 ENCOUNTER — ANTI-COAG VISIT (OUTPATIENT)
Dept: CARDIOLOGY | Facility: CLINIC | Age: 72
End: 2017-01-31

## 2017-01-31 DIAGNOSIS — Z79.01 LONG-TERM (CURRENT) USE OF ANTICOAGULANTS: ICD-10-CM

## 2017-01-31 LAB — INR PPP: 1.5

## 2017-01-31 NOTE — PROGRESS NOTES
Wife verified correct dose   Reports taking Temazepam on Wednesday    Possibly missed dose on last night but not sure.   No diet change reported

## 2017-02-01 ENCOUNTER — APPOINTMENT (OUTPATIENT)
Dept: INFUSION THERAPY | Facility: HOSPITAL | Age: 72
End: 2017-02-01
Attending: INTERNAL MEDICINE
Payer: MEDICARE

## 2017-02-01 ENCOUNTER — TELEPHONE (OUTPATIENT)
Dept: HEMATOLOGY/ONCOLOGY | Facility: CLINIC | Age: 72
End: 2017-02-01

## 2017-02-01 NOTE — TELEPHONE ENCOUNTER
Called Dr. Owusu at [phone] to notify him of the patient need ing blood. Dr. Owusu to t/s patient at [facility] today for blood during dialysis tomorrow. Wife notified of need to go to hospital for t/s and that the patient will need blood during dialysis tomorrow. She was also notified that no chemo infusion is needed for toady. Wife also notified that Dr. Donahue would like to see patient in 10 days with lab work (cbc and cmp). Wife verbalized understanding of information and the need of blood tomorrow and t/s today.

## 2017-02-02 DIAGNOSIS — D64.9 ANEMIA, UNSPECIFIED TYPE: Primary | ICD-10-CM

## 2017-02-06 ENCOUNTER — ANTI-COAG VISIT (OUTPATIENT)
Dept: CARDIOLOGY | Facility: CLINIC | Age: 72
End: 2017-02-06

## 2017-02-06 DIAGNOSIS — Z79.01 LONG-TERM (CURRENT) USE OF ANTICOAGULANTS: ICD-10-CM

## 2017-02-06 LAB — INR PPP: 1.5

## 2017-02-07 ENCOUNTER — TELEPHONE (OUTPATIENT)
Dept: HEMATOLOGY/ONCOLOGY | Facility: CLINIC | Age: 72
End: 2017-02-07

## 2017-02-07 NOTE — TELEPHONE ENCOUNTER
----- Message from Fe Reyes LPN sent at 2/7/2017 12:13 PM CST -----  Contact: Barbara      ----- Message -----     From: Betsy Aguilar     Sent: 2/7/2017  11:20 AM       To: [facility] Staff    Patient's wife is asking to have appointment for today to be rescheduled as due to weather and patient is not feeling well. Please call [phone]. Thanks!

## 2017-02-10 ENCOUNTER — OFFICE VISIT (OUTPATIENT)
Dept: HEMATOLOGY/ONCOLOGY | Facility: CLINIC | Age: 72
End: 2017-02-10
Payer: MEDICARE

## 2017-02-10 ENCOUNTER — INFUSION (OUTPATIENT)
Dept: INFUSION THERAPY | Facility: HOSPITAL | Age: 72
End: 2017-02-10
Attending: INTERNAL MEDICINE
Payer: MEDICARE

## 2017-02-10 VITALS
WEIGHT: 115.81 LBS | HEART RATE: 87 BPM | DIASTOLIC BLOOD PRESSURE: 75 MMHG | HEIGHT: 74 IN | BODY MASS INDEX: 14.86 KG/M2 | SYSTOLIC BLOOD PRESSURE: 119 MMHG | RESPIRATION RATE: 14 BRPM

## 2017-02-10 DIAGNOSIS — C18.2 MALIGNANT NEOPLASM OF ASCENDING COLON: Primary | ICD-10-CM

## 2017-02-10 DIAGNOSIS — C78.7 COLON CANCER METASTASIZED TO LIVER: ICD-10-CM

## 2017-02-10 DIAGNOSIS — C78.7 METASTATIC COLON CANCER TO LIVER: Primary | ICD-10-CM

## 2017-02-10 DIAGNOSIS — N18.6 ESRD (END STAGE RENAL DISEASE): ICD-10-CM

## 2017-02-10 DIAGNOSIS — Z79.01 LONG-TERM (CURRENT) USE OF ANTICOAGULANTS: ICD-10-CM

## 2017-02-10 DIAGNOSIS — C18.9 COLON CANCER METASTASIZED TO LIVER: ICD-10-CM

## 2017-02-10 DIAGNOSIS — E78.2 MIXED HYPERLIPIDEMIA: ICD-10-CM

## 2017-02-10 DIAGNOSIS — C78.00 MALIGNANT NEOPLASM METASTATIC TO LUNG, UNSPECIFIED LATERALITY: ICD-10-CM

## 2017-02-10 DIAGNOSIS — C18.9 METASTATIC COLON CANCER TO LIVER: Primary | ICD-10-CM

## 2017-02-10 PROCEDURE — 25000003 PHARM REV CODE 250: Mod: PN | Performed by: INTERNAL MEDICINE

## 2017-02-10 PROCEDURE — 96523 IRRIG DRUG DELIVERY DEVICE: CPT | Mod: PN

## 2017-02-10 PROCEDURE — 99999 PR PBB SHADOW E&M-EST. PATIENT-LVL III: CPT | Mod: PBBFAC,,, | Performed by: INTERNAL MEDICINE

## 2017-02-10 PROCEDURE — 99214 OFFICE O/P EST MOD 30 MIN: CPT | Mod: S$PBB,,, | Performed by: INTERNAL MEDICINE

## 2017-02-10 RX ORDER — HEPARIN 100 UNIT/ML
500 SYRINGE INTRAVENOUS
Status: CANCELLED | OUTPATIENT
Start: 2017-02-10

## 2017-02-10 RX ORDER — SODIUM CHLORIDE 0.9 % (FLUSH) 0.9 %
10 SYRINGE (ML) INJECTION
Status: CANCELLED | OUTPATIENT
Start: 2017-02-10

## 2017-02-10 RX ORDER — SODIUM CHLORIDE 0.9 % (FLUSH) 0.9 %
10 SYRINGE (ML) INJECTION
Status: COMPLETED | OUTPATIENT
Start: 2017-02-10 | End: 2017-02-10

## 2017-02-10 RX ORDER — HEPARIN 100 UNIT/ML
500 SYRINGE INTRAVENOUS
Status: COMPLETED | OUTPATIENT
Start: 2017-02-10 | End: 2017-02-10

## 2017-02-10 RX ADMIN — HEPARIN SODIUM (PORCINE) LOCK FLUSH IV SOLN 100 UNIT/ML 500 UNITS: 100 SOLUTION at 11:02

## 2017-02-10 RX ADMIN — Medication 10 ML: at 11:02

## 2017-02-10 NOTE — MR AVS SNAPSHOT
[place] [address]  [facility] [address]  Phone: [phone]  Fax: [phone]                  Robert Gonsales   2/10/2017 11:40 AM   Office Visit    Description:  Male : 1945   Provider:  Patti Donahue MD   Department:  [facility]           Diagnoses this Visit        Comments    Malignant neoplasm of ascending colon    -  Primary            To Do List           Future Appointments        Provider Department Dept Phone    2017 1:30 PM CHAIR 03, [facility] [phone]    2017 2:40 PM Patti Donahue MD [facility] [phone]      Goals (5 Years of Data)     None      [facility]     [facility] Care [facility] -  Assistance  Registered nurses in the [facility] provide clinical advisement, health education, appointment booking, and other advisory services.  Call for this free service at [phone].             Medications           Message regarding Medications     Verify the changes and/or additions to your medication regime listed below are the same as discussed with your clinician today.  If any of these changes or additions are incorrect, please notify your healthcare provider.             Verify that the below list of medications is an accurate representation of the medications you are currently taking.  If none reported, the list may be blank. If incorrect, please contact your healthcare provider. Carry this list with you in case of emergency.           Current Medications     allopurinol (ZYLOPRIM) 100 MG tablet Take 100 mg by mouth once daily.    diphenhydrAMINE-aluminum-magnesium hydroxide-simethicone-lidocaine HCl 2% Swish and swallow 5 mLs every 4 (four) hours as needed.    docusate sodium (COLACE) 100 MG capsule Take 1 capsule (100 mg total) by mouth 2 (two) times daily. May take over the counter.    ferrous sulfate 325 mg (65 mg iron) Tab tablet Take  "325 mg by mouth daily with breakfast.    hydrocodone-acetaminophen 5-325mg (NORCO) 5-325 mg per tablet Take 1 tablet by mouth every 4 (four) hours as needed for Pain.    lidocaine-prilocaine (EMLA) cream Apply to affected area once    megestrol (MEGACE) 400 mg/10 mL (40 mg/mL) Susp Take 10 mLs (400 mg total) by mouth once daily.    minoxidil (LONITEN) 10 MG Tab Take 2.5 mg by mouth once daily.    mirtazapine (REMERON) 30 MG tablet Take 30 mg by mouth every evening.    nitroGLYCERIN (NITROSTAT) 0.4 MG SL tablet Place 0.4 mg under the tongue every 5 (five) minutes as needed for Chest pain.    ondansetron (ZOFRAN-ODT) 8 MG TbDL Take 1 tablet (8 mg total) by mouth every 12 (twelve) hours as needed.    potassium chloride SA (K-DUR,KLOR-CON) 20 MEQ tablet TK 1 T PO AFTER DIALYSIS    prochlorperazine (COMPAZINE) 10 MG tablet Take 1 tablet (10 mg total) by mouth every 6 (six) hours as needed.    psyllium (METAMUCIL) packet Take 1 packet by mouth once daily. May take over the counter    ranitidine (ZANTAC) 75 MG tablet Take 75 mg by mouth 2 (two) times daily.    MAGALIE-ALF 0.8 mg Tab Take 1 tablet by mouth every evening.    sevelamer carbonate (RENVELA) 800 mg Tab Take 800 mg by mouth 3 (three) times daily with meals.    terazosin (HYTRIN) 5 MG capsule Take 5 mg by mouth once daily.     warfarin (COUMADIN) 2 MG tablet Take 1 1/2 tablets (3mg.) by mouth daily, except 2 1/2 tablets (5mg.) on Monday, Wednesday and Friday, Or as directed by [facility]    water Liqd 150 mL with MILK OF MAGNESIA 400 mg/5 mL Susp 400 mg, diphenhydrAMINE 12.5 mg/5 mL Elix 60 mg, nystatin 100,000 unit/mL Susp 500,000 Units SWISH & SWALLOW 5 ML'S EVERY 4 HOURS AS NEEDED           Clinical Reference Information           Your Vitals Were     BP Pulse Resp Height Weight BMI    119/75 87 14 6' 2" (1.88 m) 52.5 kg (115 lb 12.8 oz) 14.87 kg/m2      Blood Pressure          Most Recent Value    BP  119/75      Allergies as of 2/10/2017     No Known " Allergies      Immunizations Administered on Date of Encounter - 2/10/2017     None      Orders Placed During Today's Visit     Future Labs/Procedures Expected by Expires    CBC w/ DIFF  2/10/2017 4/11/2018    CMP  2/10/2017 4/11/2018      [facility] Sign-Up     Activating your [facility] account is as easy as 1-2-3!     1) Visit [email or web address], select Sign Up Now, enter this activation code and your date of birth, then select Next.  [email or web address]  Expires: 3/27/2017  4:02 PM      2) Create a username and password to use when you visit [facility] in the future and select a security question in case you lose your password and select Next.    3) Enter your e-mail address and click Sign Up!    Additional Information  If you have questions, please e-mail [email or web address] or call [phone] to talk to our [facility] staff. Remember, [facility] is NOT to be used for urgent needs. For medical emergencies, dial 911.         Language Assistance Services     ATTENTION: Language assistance services are available, free of charge. Please call [phone].      ATENCIÓN: Si habla español, tiene a ortiz disposición servicios gratuitos de asistencia lingüística. Llame al [phone].     [facility] Ý: N?u b?n nói Ti?ng Vi?t, có các d?ch v? h? tr? ngôn ng? mi?n phí dành cho b?n. G?i s? [phone].         [facility] complies with applicable Federal civil rights laws and does not discriminate on the basis of race, color, national origin, age, disability, or sex.

## 2017-02-13 ENCOUNTER — ANTI-COAG VISIT (OUTPATIENT)
Dept: CARDIOLOGY | Facility: CLINIC | Age: 72
End: 2017-02-13

## 2017-02-13 DIAGNOSIS — Z79.01 LONG-TERM (CURRENT) USE OF ANTICOAGULANTS: ICD-10-CM

## 2017-02-13 LAB — INR PPP: 1.5

## 2017-02-13 NOTE — PROGRESS NOTES
Confirmed 2mg 2/6 and correct dose    Reports taking 1 dose of Lactulose on Thursday   Reports missed dose last night    No other changes reported

## 2017-02-13 NOTE — PROGRESS NOTES
Subjective:        Subjective:       Patient ID: Robert Gonsales is a 71 y.o. male.    Chief Complaint:wenrt for dialysis with DR. Owusu and here for chemo clearance wife requesting to have discussion regarding on going rx  HPI:   A 71-year-old  male. [facility] unsure of why,  He was diagnosed with colon cancer,high-grade 5 cm, nodes positive 5 out of 38 were removed. In April 2016, he   had extensive lysis of adhesions, right hepatic resection for metastatic   lesions. In October 2015, the patient had FOLFIRI planned six cycles completed   only one out of six. In September 2015, the patient had left hepatic resection.  The patient started diagnosis in June 2014 with right partial colectomy and   liver biopsy. In August 2014, the patient had FOLFOX with Avastin 12 cycles   were planned. He completed two out of 15. The patient came to [facility] to cont rx treatment here. He is on Coumadin because of atrial fibrillation. He also has   gout. The patient has a history of congestive heart failure, coronary artery   disease, diabetes and dyslipidemia. No other significant past surgical history   except as above. The patient is here with his wife. [facility] which showed multiple lung nodules consistent with   metastatic disease. Since then, the patient has not been given any treatment of  seeing an oncologist. The patient he is on dialysis, Tuesday/thursday/ ansd Saturday.  Past 2 years.pt has had PS2+ from the start, but was very keen on going forward with rx  his appetitie is poor, he ambulates with a walker, pt refused to marie to dialysis once and refused to get labs another time. He is in and out with his memory   wife states its so hard to help him and doesn't know if all this is worth he is ahardly eating and spit out meds    PHYSICAL EXAM:     Vitals:    02/10/17 1129   BP: 119/75   Pulse: 87   Resp: 14      Debilitated, black male, disheveled, seems out of sortds, in wheelchair,awake and alert, asking for copies of our lab order sheet.   rs bilat CTA, CVS: normal exam no wheezes, no ronchi  Abd: soft, nontender, no nmasses    Laboratory:     CBC:  Lab Results   Component Value Date    WBC 20.23 (H) 02/10/2017    RBC 3.24 (L) 02/10/2017    HGB 10.0 (L) 02/10/2017    HCT 30.9 (L) 02/10/2017    MCV 95 02/10/2017    MCH 30.9 02/10/2017    MCHC 32.4 02/10/2017    RDW 19.3 (H) 02/10/2017     (L) 02/10/2017    MPV 10.2 02/10/2017    GRAN 16.1 (H) 02/10/2017    GRAN 79.5 (H) 02/10/2017    LYMPH 1.8 02/10/2017    LYMPH 8.9 (L) 02/10/2017    MONO 2.3 (H) 02/10/2017    MONO 11.2 02/10/2017    EOS 0.0 02/10/2017    BASO 0.04 02/10/2017    EOSINOPHIL 0.2 02/10/2017    BASOPHIL 0.2 02/10/2017       BMP: BMP  Lab Results   Component Value Date     (L) 02/10/2017    K 3.4 (L) 02/10/2017    CL 94 (L) 02/10/2017    CO2 30 02/10/2017    BUN 20 02/10/2017    CREATININE 2.72 (H) 02/10/2017    CALCIUM 8.3 (L) 02/10/2017    ANIONGAP 11 02/10/2017    ESTGFRAFRICA 26 (A) 02/10/2017    EGFRNONAA 23 (A) 02/10/2017       LFT:   Lab Results   Component Value Date    ALT 28 02/10/2017    AST 34 02/10/2017    ALKPHOS 176 (H) 02/10/2017    BILITOT 1.6 (H) 02/10/2017     BNP will remain high per DR. pino due to renal failure so cardiolgy f/u canceled spoke to DR. Pino    Assessment/Plan:       1. Malignant neoplasm of ascending colon    2. Malignant neoplasm metastatic to lung, unspecified laterality    3. ESRD (end stage renal disease)    4. Colon cancer metastasized to liver    5. Long-term (current) use of anticoagulants    6. Mixed hyperlipidemia        Lengthy discussion with patirnt and wife.   He is not eating   Will delay rx by a week and see if he improves. He may need to decide hospice   I have discussed his PS before with him and his wife

## 2017-02-16 ENCOUNTER — ANTI-COAG VISIT (OUTPATIENT)
Dept: CARDIOLOGY | Facility: CLINIC | Age: 72
End: 2017-02-16

## 2017-02-16 DIAGNOSIS — Z79.01 LONG-TERM (CURRENT) USE OF ANTICOAGULANTS: ICD-10-CM

## 2017-02-16 LAB — INR PPP: 2

## 2017-02-17 ENCOUNTER — TELEPHONE (OUTPATIENT)
Dept: HEMATOLOGY/ONCOLOGY | Facility: CLINIC | Age: 72
End: 2017-02-17

## 2017-02-17 ENCOUNTER — INFUSION (OUTPATIENT)
Dept: INFUSION THERAPY | Facility: HOSPITAL | Age: 72
End: 2017-02-17
Attending: INTERNAL MEDICINE
Payer: MEDICARE

## 2017-02-17 DIAGNOSIS — C78.7 METASTATIC COLON CANCER TO LIVER: ICD-10-CM

## 2017-02-17 DIAGNOSIS — C18.2 MALIGNANT NEOPLASM OF ASCENDING COLON: Primary | ICD-10-CM

## 2017-02-17 DIAGNOSIS — C18.9 METASTATIC COLON CANCER TO LIVER: ICD-10-CM

## 2017-02-17 LAB
ALBUMIN SERPL BCP-MCNC: 2.7 G/DL
ALP SERPL-CCNC: 134 U/L
ALT SERPL W/O P-5'-P-CCNC: 21 U/L
ANION GAP SERPL CALC-SCNC: 16 MMOL/L
ANISOCYTOSIS BLD QL SMEAR: ABNORMAL
AST SERPL-CCNC: 38 U/L
BASOPHILS # BLD AUTO: 0.05 K/UL
BASOPHILS NFR BLD: 0.3 %
BILIRUB SERPL-MCNC: 1.4 MG/DL
BUN SERPL-MCNC: 17 MG/DL
CALCIUM SERPL-MCNC: 8.8 MG/DL
CHLORIDE SERPL-SCNC: 98 MMOL/L
CO2 SERPL-SCNC: 24 MMOL/L
CREAT SERPL-MCNC: 3.03 MG/DL
DIFFERENTIAL METHOD: ABNORMAL
EOSINOPHIL # BLD AUTO: 0.1 K/UL
EOSINOPHIL NFR BLD: 0.4 %
ERYTHROCYTE [DISTWIDTH] IN BLOOD BY AUTOMATED COUNT: 23.9 %
EST. GFR  (AFRICAN AMERICAN): 23 ML/MIN/1.73 M^2
EST. GFR  (NON AFRICAN AMERICAN): 20 ML/MIN/1.73 M^2
GIANT PLATELETS BLD QL SMEAR: PRESENT
GLUCOSE SERPL-MCNC: 84 MG/DL
HCT VFR BLD AUTO: 29.7 %
HGB BLD-MCNC: 9.3 G/DL
LYMPHOCYTES # BLD AUTO: 1.6 K/UL
LYMPHOCYTES NFR BLD: 8.6 %
MCH RBC QN AUTO: 32.1 PG
MCHC RBC AUTO-ENTMCNC: 31.3 %
MCV RBC AUTO: 102 FL
MONOCYTES # BLD AUTO: 2.7 K/UL
MONOCYTES NFR BLD: 14.1 %
NEUTROPHILS # BLD AUTO: 14.7 K/UL
NEUTROPHILS NFR BLD: 76.6 %
NRBC BLD-RTO: 0 /100 WBC
OVALOCYTES BLD QL SMEAR: ABNORMAL
PLATELET # BLD AUTO: 160 K/UL
PLATELET BLD QL SMEAR: ABNORMAL
PMV BLD AUTO: 10.1 FL
POIKILOCYTOSIS BLD QL SMEAR: SLIGHT
POLYCHROMASIA BLD QL SMEAR: ABNORMAL
POTASSIUM SERPL-SCNC: 3.9 MMOL/L
PROT SERPL-MCNC: 6.4 G/DL
RBC # BLD AUTO: 2.9 M/UL
SODIUM SERPL-SCNC: 138 MMOL/L
WBC # BLD AUTO: 19.16 K/UL

## 2017-02-17 PROCEDURE — 80053 COMPREHEN METABOLIC PANEL: CPT

## 2017-02-17 PROCEDURE — 96523 IRRIG DRUG DELIVERY DEVICE: CPT | Mod: PN

## 2017-02-17 PROCEDURE — 85025 COMPLETE CBC W/AUTO DIFF WBC: CPT | Mod: PN

## 2017-02-17 PROCEDURE — 25000003 PHARM REV CODE 250: Mod: PN | Performed by: INTERNAL MEDICINE

## 2017-02-17 PROCEDURE — 80053 COMPREHEN METABOLIC PANEL: CPT | Mod: PN

## 2017-02-17 PROCEDURE — 85025 COMPLETE CBC W/AUTO DIFF WBC: CPT

## 2017-02-17 RX ORDER — SODIUM CHLORIDE 0.9 % (FLUSH) 0.9 %
10 SYRINGE (ML) INJECTION
Status: COMPLETED | OUTPATIENT
Start: 2017-02-17 | End: 2017-02-17

## 2017-02-17 RX ORDER — HEPARIN 100 UNIT/ML
500 SYRINGE INTRAVENOUS
Status: CANCELLED | OUTPATIENT
Start: 2017-02-17

## 2017-02-17 RX ORDER — HEPARIN 100 UNIT/ML
500 SYRINGE INTRAVENOUS
Status: COMPLETED | OUTPATIENT
Start: 2017-02-17 | End: 2017-02-17

## 2017-02-17 RX ORDER — SODIUM CHLORIDE 0.9 % (FLUSH) 0.9 %
10 SYRINGE (ML) INJECTION
Status: CANCELLED | OUTPATIENT
Start: 2017-02-17

## 2017-02-17 RX ADMIN — SODIUM CHLORIDE, PRESERVATIVE FREE 10 ML: 5 INJECTION INTRAVENOUS at 03:02

## 2017-02-17 RX ADMIN — HEPARIN 500 UNITS: 100 SYRINGE at 03:02

## 2017-02-17 NOTE — TELEPHONE ENCOUNTER
[facility] notified that patient to placed on hospice with them. Nurse states patient is already on hospice. I clarified that the patient has still been on dialysis and receiving chemo and nurse stated that the patient is currently on Hospice. Dr. Donahue notified.

## 2017-02-20 ENCOUNTER — ANTI-COAG VISIT (OUTPATIENT)
Dept: CARDIOLOGY | Facility: CLINIC | Age: 72
End: 2017-02-20

## 2017-02-20 DIAGNOSIS — Z79.01 LONG-TERM (CURRENT) USE OF ANTICOAGULANTS: ICD-10-CM

## 2017-02-20 LAB — INR PPP: 1.7

## 2017-02-21 ENCOUNTER — TELEPHONE (OUTPATIENT)
Dept: HEMATOLOGY/ONCOLOGY | Facility: CLINIC | Age: 72
End: 2017-02-21

## 2017-02-21 NOTE — PROGRESS NOTES
Missed warfarin dose on Sunday and Monday   Patient admitted to [facility] in [place] this morning.  Advised wife to call upon patient's discharge

## 2017-02-24 ENCOUNTER — ANTI-COAG VISIT (OUTPATIENT)
Dept: CARDIOLOGY | Facility: CLINIC | Age: 72
End: 2017-02-24

## 2017-02-24 DIAGNOSIS — Z79.01 LONG-TERM (CURRENT) USE OF ANTICOAGULANTS: ICD-10-CM

## 2017-03-27 ENCOUNTER — TELEPHONE (OUTPATIENT)
Dept: HEMATOLOGY/ONCOLOGY | Facility: CLINIC | Age: 72
End: 2017-03-27

## 2017-03-27 NOTE — TELEPHONE ENCOUNTER
MRs. Gonsales notified that Dr. Donahue declined to fill out paperwork due to the patient being on hospice and no longer in her care and that she can get any needed paprerwork from [facility] medical records department.

## 2017-03-27 NOTE — TELEPHONE ENCOUNTER
----- Message from Helen Patiño LPN sent at 3/27/2017  2:27 PM CDT -----  Contact: Wife/Barbara      ----- Message -----     From: Yue Vick LPN     Sent: 3/27/2017   2:25 PM       To: Nicole Carmichael LPN        ----- Message -----     From: Javier Goins     Sent: 3/24/2017  10:26 AM       To: [facility] Staff    Barbara called in regarding the attached patient (-Robert).  Barbara stated she left papers on patient to be filled out about a week ago and wanted to know the status of completion and if she could get a call back when they are completed.    Barbara's call back number is [phone]

## 2017-03-28 ENCOUNTER — TELEPHONE (OUTPATIENT)
Dept: HEMATOLOGY/ONCOLOGY | Facility: CLINIC | Age: 72
End: 2017-03-28

## 2017-03-28 NOTE — TELEPHONE ENCOUNTER
Wife notified that after going over information with Dr. Donahue, form was signed and the needed paperwork attached. Wife notified that the paper were filled out as best as she could.